# Patient Record
(demographics unavailable — no encounter records)

---

## 2024-12-12 NOTE — REASON FOR VISIT
[Telehealth (audio & video) - Individual/Group] : This visit was provided via telehealth using real-time 2-way audio visual technology. [Medical Office: ([facility])___] : The provider was located at the medical office in [unfilled]. [Home] : The patient, [unfilled], was located at home, [unfilled], at the time of the visit. [Participant(s) identity verified] : Participant(s) identity verified. [Verbal consent obtained from patient/other participant(s)] : Verbal consent for telehealth/telephonic services obtained from patient/other participant(s) [If not patient, verbal consent obtained from parent/guardian/caretaker (name, relationship) ___ with patient assenting] : Verbal consent for telehealth/telephonic services was obtained from parent/guardian/caretaker, [unfilled], with patient assenting. [Patient] : Patient [Father] : father [FreeTextEntry1] : anxiety

## 2024-12-12 NOTE — HISTORY OF PRESENT ILLNESS
[FreeTextEntry1] : Interval History: Patient is a 13 year old girl single, unemployed, domiciled with biological parents and younger brother 7 years old, currently in 8th grade student at [employer], with IEP receiving resource room, extended time, and individual counseling once a week, with no PMHx of and PPHx of ASD, social pragmatic disorder and anxiety, no previous psychiatric hospitalization, no previous suicide attempts, currently not in treatment, was referred to clinic for anxiety.   Since last visit, pts prozac was continued at  20mg. Pt states she is doing well. School is going well, she enjoys classes and teacher.  She is excited for winter break as her friends are coming home from college and she will get to see them. She is going to [place] for school trip in feb, after that, she may want to consider lowering prozac back to 15mg.  No side effects.  No si/hi.   no keyshawn or psychosis.   Dad brought in who confirms she is doing great   Risk Assessment: Low risk for suicide/ aggression both acutely and chronically. RISK Factors: anxiety PROTECTIVE Factors: no previous attempt, no access to lethal means/ no access to firearm, no substance abuse, no legal history, no history of aggression, does not present with vindictive intent, no SI/HI, no psychosis, positive therapeutic relationship, engaged in school, reality testing intact, good social support, future oriented, no previous suicide attempts or violent history.

## 2024-12-12 NOTE — SOCIAL HISTORY
[With Family] : lives with family [Unemployed] : unemployed [Never ] : never  [Less Than High School] : less than high school [None] : none [FreeTextEntry1] : No changes since last visit

## 2024-12-12 NOTE — FAMILY HISTORY
[FreeTextEntry1] : -maternal grandmother (moms side) - anxiety\par  -mother - anxiety and depression - xanax PRN, zoloft and trazodone in the past\par  -paternal grandmother - anxious and sad\par  \par  No family history of suicide \par

## 2024-12-12 NOTE — HISTORY OF PRESENT ILLNESS
[FreeTextEntry1] : Interval History: Patient is a 13 year old girl single, unemployed, domiciled with biological parents and younger brother 7 years old, currently in 8th grade student at [employer], with IEP receiving resource room, extended time, and individual counseling once a week, with no PMHx of and PPHx of ASD, social pragmatic disorder and anxiety, no previous psychiatric hospitalization, no previous suicide attempts, currently not in treatment, was referred to clinic for anxiety.   Since last visit, pts prozac was continued at  20mg. Pt states she is doing well. School is going well, she enjoys classes and teacher.  She is excited for winter break as her friends are coming home from college and she will get to see them. She is going to [place] for school trip in feb, after that, she may want to consider lowering prozac back to 15mg.  No side effects.  No si/hi.   no keyshawn or psychosis.   Dad brought in who confirms she is doing great   Risk Assessment: Low risk for suicide/ aggression both acutely and chronically. RISK Factors: anxiety PROTECTIVE Factors: no previous attempt, no access to lethal means/ no access to firearm, no substance abuse, no legal history, no history of aggression, does not present with vindictive intent, no SI/HI, no psychosis, positive therapeutic relationship, engaged in school, reality testing intact, good social support, future oriented, no previous suicide attempts or violent history.

## 2024-12-12 NOTE — PLAN
[FreeTextEntry5] :  -counseling and support provided -continue prozac 20mg po daily for depressive thoughts and anxiety. pt and dad agree.-may decrease to 15mg once she is back from [place].  -melatonin prn , sleep hygiene -er/911 PRN -f/u in 3 months

## 2024-12-12 NOTE — REASON FOR VISIT
[Telehealth (audio & video) - Individual/Group] : This visit was provided via telehealth using real-time 2-way audio visual technology. [Medical Office: ([facility])___] : The provider was located at the medical office in [unfilled]. [Home] : The patient, [unfilled], was located at home, [unfilled], at the time of the visit. [Participant(s) identity verified] : Participant(s) identity verified. [Verbal consent obtained from patient/other participant(s)] : Verbal consent for telehealth/telephonic services obtained from patient/other participant(s) [If not patient, verbal consent obtained from parent/guardian/caretaker (name, relationship) ___ with patient assenting] : Verbal consent for telehealth/telephonic services was obtained from parent/guardian/caretaker, [unfilled], with patient assenting. [Patient] : Patient [Father] : father [FreeTextEntry1] : anxiety  Patient

## 2025-04-09 NOTE — PLAN
[FreeTextEntry5] : counseling and support provided -continue prozac 20mg po daily for depressive thoughts and anxiety. -melatonin prn , sleep hygiene -er/911 PRN -f/u in 3 months

## 2025-04-09 NOTE — HISTORY OF PRESENT ILLNESS
[FreeTextEntry1] : Interval History: Patient is a 13 year old girl single, unemployed, domiciled with biological parents and younger brother 7 years old, currently in 8th grade student at [employer], with IEP receiving resource room, extended time, and individual counseling once a week, with no PMHx of and PPHx of ASD, social pragmatic disorder and anxiety, no previous psychiatric hospitalization, no previous suicide attempts, currently not in treatment, was referred to clinic for anxiety.   Since last visit, pts prozac was continued at  20mg. Pt states she is doing well. The trip to [facility] was fine. She is worried about college and visiting. She feels like her parents want her to do everything and she is overwhelmed. Pt i also upset regarding current political state of the world. We spoke about lowering medicine last visit but will keep medicine the same for now as its a stressful time of year   no keyshawn or psychosis.   Dad brought in who confirms above  Risk Assessment: Low risk for suicide/ aggression both acutely and chronically. RISK Factors: anxiety PROTECTIVE Factors: no previous attempt, no access to lethal means/ no access to firearm, no substance abuse, no legal history, no history of aggression, does not present with vindictive intent, no SI/HI, no psychosis, positive therapeutic relationship, engaged in school, reality testing intact, good social support, future oriented, no previous suicide attempts or violent history.

## 2025-04-09 NOTE — HISTORY OF PRESENT ILLNESS
[FreeTextEntry1] : Interval History: Patient is a 13 year old girl single, unemployed, domiciled with biological parents and younger brother 7 years old, currently in 8th grade student at [employer], with IEP receiving resource room, extended time, and individual counseling once a week, with no PMHx of and PPHx of ASD, social pragmatic disorder and anxiety, no previous psychiatric hospitalization, no previous suicide attempts, currently not in treatment, was referred to clinic for anxiety.   Since last visit, pts prozac was continued at  20mg. Pt states she is doing well. The trip to [place] [facility] was fine. She is worried about college and visiting. She feels like her parents want her to do everything and she is overwhelmed. Pt i also upset regarding current political state of the world. We spoke about lowering medicine last visit but will keep medicine the same for now as its a stressful time of year   no keyshawn or psychosis.   Dad brought in who confirms above  Risk Assessment: Low risk for suicide/ aggression both acutely and chronically. RISK Factors: anxiety PROTECTIVE Factors: no previous attempt, no access to lethal means/ no access to firearm, no substance abuse, no legal history, no history of aggression, does not present with vindictive intent, no SI/HI, no psychosis, positive therapeutic relationship, engaged in school, reality testing intact, good social support, future oriented, no previous suicide attempts or violent history.

## 2025-07-10 NOTE — REASON FOR VISIT
[Telehealth (audio & video) - Individual/Group] : This visit was provided via telehealth using real-time 2-way audio visual technology. [Medical Office: ([facility])___] : The provider was located at the medical office in [unfilled]. [Home] : The patient, [unfilled], was located at home, [unfilled], at the time of the visit. [Participant(s) identity verified] : Participant(s) identity verified. [Verbal consent obtained from patient/other participant(s)] : Verbal consent for telehealth/telephonic services obtained from patient/other participant(s) [If not patient, verbal consent obtained from parent/guardian/caretaker (name, relationship) ___ with patient assenting] : Verbal consent for telehealth/telephonic services was obtained from parent/guardian/caretaker, [unfilled], with patient assenting. [Patient] : Patient [Father] : father [FreeTextEntry1] : anxiety

## 2025-07-10 NOTE — HISTORY OF PRESENT ILLNESS
[FreeTextEntry1] : Interval History: Patient is a 13 year old girl single, unemployed, domiciled with biological parents and younger brother 7 years old, currently in 8th grade student at [employer], with IEP receiving resource room, extended time, and individual counseling once a week, with no PMHx of and PPHx of ASD, social pragmatic disorder and anxiety, no previous psychiatric hospitalization, no previous suicide attempts, currently not in treatment, was referred to clinic for anxiety.   Since last visit, pts prozac was continued at  20mg. Pt states she is doing well. She enjoyed raj[employer] prom, she participated and was the light of the party. She is taking her medicine, she is wondering what its really  doing for her and is curious to think about lowering and coming off. She is working at the [employer]. She denies side effects to the medicine. She did really well on her sats. She is also looking fwd to family cruise trip at the end of the summer to [place].   no keyshawn or psychosis. no suicidal ideation intent or plan.   Dad brought in who confirms above  Risk Assessment: Low risk for suicide/ aggression both acutely and chronically. RISK Factors: anxiety PROTECTIVE Factors: no previous attempt, no access to lethal means/ no access to firearm, no substance abuse, no legal history, no history of aggression, does not present with vindictive intent, no SI/HI, no psychosis, positive therapeutic relationship, engaged in school, reality testing intact, good social support, future oriented, no previous suicide attempts or violent history.